# Patient Record
Sex: MALE | Race: WHITE | NOT HISPANIC OR LATINO
[De-identification: names, ages, dates, MRNs, and addresses within clinical notes are randomized per-mention and may not be internally consistent; named-entity substitution may affect disease eponyms.]

---

## 2023-10-26 ENCOUNTER — DASHBOARD ENCOUNTERS (OUTPATIENT)
Age: 88
End: 2023-10-26

## 2023-10-26 ENCOUNTER — OFFICE VISIT (OUTPATIENT)
Dept: RURAL CLINIC 4 | Facility: CLINIC | Age: 88
End: 2023-10-26
Payer: MEDICARE

## 2023-10-26 ENCOUNTER — LAB OUTSIDE AN ENCOUNTER (OUTPATIENT)
Dept: RURAL CLINIC 4 | Facility: CLINIC | Age: 88
End: 2023-10-26

## 2023-10-26 VITALS
SYSTOLIC BLOOD PRESSURE: 141 MMHG | WEIGHT: 190 LBS | DIASTOLIC BLOOD PRESSURE: 90 MMHG | BODY MASS INDEX: 28.14 KG/M2 | TEMPERATURE: 96.8 F | HEART RATE: 49 BPM | HEIGHT: 69 IN

## 2023-10-26 DIAGNOSIS — K76.0 FATTY LIVER DISEASE, NONALCOHOLIC: ICD-10-CM

## 2023-10-26 DIAGNOSIS — I10 HYPERTENSION, UNSPECIFIED TYPE: ICD-10-CM

## 2023-10-26 DIAGNOSIS — E78.5 HYPERLIPIDEMIA, UNSPECIFIED HYPERLIPIDEMIA TYPE: ICD-10-CM

## 2023-10-26 PROBLEM — 313436004: Status: ACTIVE | Noted: 2023-10-26

## 2023-10-26 PROBLEM — 1231824009: Status: ACTIVE | Noted: 2023-10-26

## 2023-10-26 PROBLEM — 38341003: Status: ACTIVE | Noted: 2023-10-26

## 2023-10-26 PROBLEM — 55822004: Status: ACTIVE | Noted: 2023-10-26

## 2023-10-26 PROCEDURE — 99203 OFFICE O/P NEW LOW 30 MIN: CPT | Performed by: INTERNAL MEDICINE

## 2023-10-26 RX ORDER — GLUCOSAMINE/D3/BOSWELLIA SERRA 1500MG-400
AS DIRECTED TABLET ORAL
Status: ACTIVE | COMMUNITY

## 2023-10-26 RX ORDER — SODIUM BICARBONATE TAB 650 MG 650 MG
AS DIRECTED TAB ORAL
Status: ACTIVE | COMMUNITY

## 2023-10-26 RX ORDER — BRIMONIDINE TARTRATE, TIMOLOL MALEATE 2; 5 MG/ML; MG/ML
1 DROP INTO AFFECTED EYE SOLUTION/ DROPS OPHTHALMIC TWICE A DAY
Status: ACTIVE | COMMUNITY

## 2023-10-26 RX ORDER — SIMVASTATIN 20 MG
1 TABLET IN THE EVENING TABLET ORAL ONCE A DAY
Status: ACTIVE | COMMUNITY

## 2023-10-26 RX ORDER — LISINOPRIL AND HYDROCHLOROTHIAZIDE 20; 12.5 MG/1; MG/1
1 TABLET TABLET ORAL ONCE A DAY
Status: ACTIVE | COMMUNITY

## 2023-10-26 RX ORDER — METOPROLOL TARTRATE 25 MG/1
1 TABLET WITH FOOD TABLET, FILM COATED ORAL TWICE A DAY
Status: ACTIVE | COMMUNITY

## 2023-10-26 RX ORDER — FEBUXOSTAT 40 MG/1
1 TABLET TABLET ORAL ONCE A DAY
Status: ACTIVE | COMMUNITY

## 2023-10-26 RX ORDER — APIXABAN 5 MG/1
1 TABLET TABLET, FILM COATED ORAL TWICE A DAY
Status: ACTIVE | COMMUNITY

## 2023-10-26 NOTE — HPI-ZZZTODAY'S VISIT
The patient is an 88-year-old gentleman who presents on referral from Dr. Sonam Park for the diagnosis of nonalcoholic fatty liver disease.  A copy of this document to be sent to the referring provider.  The patient underwent a CT scan of the abdomen and pelvis in October 2022 with incidental findings of hepatic steatosis.  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  The patient denies alcohol use in the past or currently.  History of elevated BMI at 28.1, hypertension and hyperlipidemia.

## 2025-04-17 ENCOUNTER — OFFICE VISIT (OUTPATIENT)
Dept: RURAL CLINIC 4 | Facility: CLINIC | Age: 89
End: 2025-04-17
Payer: MEDICARE

## 2025-04-17 ENCOUNTER — LAB OUTSIDE AN ENCOUNTER (OUTPATIENT)
Dept: RURAL CLINIC 4 | Facility: CLINIC | Age: 89
End: 2025-04-17

## 2025-04-17 DIAGNOSIS — E78.5 HYPERLIPIDEMIA, UNSPECIFIED HYPERLIPIDEMIA TYPE: ICD-10-CM

## 2025-04-17 DIAGNOSIS — K76.0 FATTY LIVER DISEASE, NONALCOHOLIC: ICD-10-CM

## 2025-04-17 DIAGNOSIS — I10 HYPERTENSION, UNSPECIFIED TYPE: ICD-10-CM

## 2025-04-17 PROCEDURE — 99213 OFFICE O/P EST LOW 20 MIN: CPT | Performed by: NURSE PRACTITIONER

## 2025-04-17 RX ORDER — GLUCOSAMINE/D3/BOSWELLIA SERRA 1500MG-400
AS DIRECTED TABLET ORAL
Status: ACTIVE | COMMUNITY

## 2025-04-17 RX ORDER — BRIMONIDINE TARTRATE, TIMOLOL MALEATE 2; 5 MG/ML; MG/ML
1 DROP INTO AFFECTED EYE SOLUTION/ DROPS OPHTHALMIC TWICE A DAY
Status: ACTIVE | COMMUNITY

## 2025-04-17 RX ORDER — APIXABAN 5 MG/1
1 TABLET TABLET, FILM COATED ORAL TWICE A DAY
Status: DISCONTINUED | COMMUNITY

## 2025-04-17 RX ORDER — SIMVASTATIN 20 MG
1 TABLET IN THE EVENING TABLET ORAL ONCE A DAY
Status: ACTIVE | COMMUNITY

## 2025-04-17 RX ORDER — FEBUXOSTAT 40 MG/1
1 TABLET TABLET ORAL ONCE A DAY
Status: ACTIVE | COMMUNITY

## 2025-04-17 RX ORDER — METOPROLOL TARTRATE 25 MG/1
1/2 TABLET WITH FOOD TABLET, FILM COATED ORAL TWICE A DAY
Status: ACTIVE | COMMUNITY

## 2025-04-17 RX ORDER — LISINOPRIL AND HYDROCHLOROTHIAZIDE 20; 12.5 MG/1; MG/1
1 TABLET TABLET ORAL ONCE A DAY
Status: ACTIVE | COMMUNITY

## 2025-04-17 RX ORDER — SODIUM BICARBONATE TAB 650 MG 650 MG
AS DIRECTED TAB ORAL
Status: DISCONTINUED | COMMUNITY

## 2025-04-17 NOTE — HPI-ZZZTODAY'S VISIT
The patient is an 88-year-old gentleman who presents on referral from Dr. Sonam Park for the diagnosis of nonalcoholic fatty liver disease.  A copy of this document to be sent to the referring provider.  The patient underwent a CT scan of the abdomen and pelvis in October 2022 with incidental findings of hepatic steatosis.  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  The patient denies alcohol use in the past or currently.  History of elevated BMI at 28.1, hypertension and hyperlipidemia.  04/17/2025: The patient is here on referral from Dr. Sonam Park for surveillance of fatty liver disease.  A copy of this document to be sent to the referring provider.  The patient was last seen 18 months ago for fatty liver disease with findings of normal liver enzymes and fibrosis score of F3 on FibroSure blood test.  Abdominal ultrasound shows a normal liver.  He offers no current complaints and continues on a weight reducing diet and exercise is limited.